# Patient Record
Sex: FEMALE | Race: WHITE | NOT HISPANIC OR LATINO | URBAN - METROPOLITAN AREA
[De-identification: names, ages, dates, MRNs, and addresses within clinical notes are randomized per-mention and may not be internally consistent; named-entity substitution may affect disease eponyms.]

---

## 2018-07-10 PROBLEM — H25.11 NS CATARACT: Noted: 2022-01-25

## 2018-07-10 PROBLEM — H35.81 MACULAR EDEMA: Noted: 2019-02-27

## 2018-07-10 PROBLEM — H34.8110 CENTRAL RETINAL VEIN OCCLUSION: Noted: 2022-01-25

## 2018-07-10 PROBLEM — H43.813 POSTERIOR VITREOUS DETACHMENT: Noted: 2022-01-25

## 2018-07-10 PROBLEM — D31.31 CHOROIDAL NEVUS: Noted: 2022-01-25

## 2018-07-10 PROBLEM — H35.032 HYPERTENSIVE RETINOPATHY: Noted: 2022-01-25

## 2019-02-27 PROBLEM — D31.31 CHOROIDAL NEVUS: Noted: 2022-01-25

## 2019-02-27 PROBLEM — H35.81 MACULAR EDEMA: Noted: 2019-02-27

## 2019-02-27 PROBLEM — H43.813 POSTERIOR VITREOUS DETACHMENT: Noted: 2022-01-25

## 2019-02-27 PROBLEM — H35.032 HYPERTENSIVE RETINOPATHY: Noted: 2022-01-25

## 2019-02-27 PROBLEM — H25.13 NS CATARACT: Noted: 2022-01-25

## 2019-02-27 PROBLEM — H34.8110 CENTRAL RETINAL VEIN OCCLUSION: Noted: 2022-01-25

## 2022-01-25 ENCOUNTER — PREPPED CHART (OUTPATIENT)
Dept: URBAN - METROPOLITAN AREA CLINIC 49 | Facility: CLINIC | Age: 77
End: 2022-01-25

## 2022-01-25 PROBLEM — H34.8110 CENTRAL RETINAL VEIN OCCLUSION: Status: STABILIZING | Noted: 2022-01-25

## 2022-01-25 PROBLEM — H43.813 POSTERIOR VITREOUS DETACHMENT: Status: STABILIZING | Noted: 2022-01-25

## 2022-01-25 PROBLEM — H35.032 HYPERTENSIVE RETINOPATHY: Status: STABILIZING | Noted: 2022-01-25

## 2022-01-25 PROBLEM — D31.31 CHOROIDAL NEVUS: Status: STABILIZING | Noted: 2022-01-25

## 2022-01-25 PROBLEM — H34.8110 CENTRAL RETINAL VEIN OCCLUSION: Noted: 2022-01-25

## 2022-01-25 PROBLEM — H25.13 NS CATARACT: Status: STABILIZING | Noted: 2022-01-25

## 2022-01-25 PROBLEM — H35.032 HYPERTENSIVE RETINOPATHY: Noted: 2022-01-25

## 2022-01-25 PROBLEM — H43.813 POSTERIOR VITREOUS DETACHMENT: Noted: 2022-01-25

## 2022-01-25 PROBLEM — H25.13 NS CATARACT: Noted: 2022-01-25

## 2022-01-25 PROBLEM — D31.31 CHOROIDAL NEVUS: Noted: 2022-01-25

## 2022-03-17 ASSESSMENT — TONOMETRY
OS_IOP_MMHG: 14
OD_IOP_MMHG: 12

## 2022-03-17 ASSESSMENT — VISUAL ACUITY
OD_CC: CF 3FT
OS_CC: 20/30

## 2022-03-22 ENCOUNTER — FOLLOW UP (OUTPATIENT)
Dept: URBAN - METROPOLITAN AREA CLINIC 49 | Facility: CLINIC | Age: 77
End: 2022-03-22

## 2022-03-22 DIAGNOSIS — H43.813: ICD-10-CM

## 2022-03-22 DIAGNOSIS — H35.032: ICD-10-CM

## 2022-03-22 DIAGNOSIS — H34.8110: ICD-10-CM

## 2022-03-22 DIAGNOSIS — D31.31: ICD-10-CM

## 2022-03-22 PROCEDURE — 92134 CPTRZ OPH DX IMG PST SGM RTA: CPT

## 2022-03-22 PROCEDURE — PFS EYLEA PFS

## 2022-03-22 PROCEDURE — 67028 INJECTION EYE DRUG: CPT

## 2022-03-22 PROCEDURE — 92014 COMPRE OPH EXAM EST PT 1/>: CPT

## 2022-03-22 ASSESSMENT — VISUAL ACUITY
OD_CC: CF 3FT
OS_CC: 20/30

## 2022-03-22 ASSESSMENT — TONOMETRY
OD_IOP_MMHG: 14
OS_IOP_MMHG: 14

## 2022-05-17 ENCOUNTER — FOLLOW UP (OUTPATIENT)
Dept: URBAN - METROPOLITAN AREA CLINIC 49 | Facility: CLINIC | Age: 77
End: 2022-05-17

## 2022-05-17 DIAGNOSIS — H34.8110: ICD-10-CM

## 2022-05-17 DIAGNOSIS — D31.31: ICD-10-CM

## 2022-05-17 DIAGNOSIS — H43.813: ICD-10-CM

## 2022-05-17 DIAGNOSIS — H25.13: ICD-10-CM

## 2022-05-17 DIAGNOSIS — H35.032: ICD-10-CM

## 2022-05-17 PROCEDURE — PFS EYLEA PFS

## 2022-05-17 PROCEDURE — 99214 OFFICE O/P EST MOD 30 MIN: CPT | Mod: 25

## 2022-05-17 PROCEDURE — 67028 INJECTION EYE DRUG: CPT

## 2022-05-17 PROCEDURE — 92134 CPTRZ OPH DX IMG PST SGM RTA: CPT

## 2022-05-17 ASSESSMENT — VISUAL ACUITY
OD_SC: CF 2FT
OS_CC: 20/40

## 2022-05-17 ASSESSMENT — TONOMETRY
OD_IOP_MMHG: 13
OS_IOP_MMHG: 16

## 2022-07-27 ENCOUNTER — FOLLOW UP (OUTPATIENT)
Dept: URBAN - METROPOLITAN AREA CLINIC 49 | Facility: CLINIC | Age: 77
End: 2022-07-27

## 2022-07-27 DIAGNOSIS — H25.13: ICD-10-CM

## 2022-07-27 DIAGNOSIS — H35.032: ICD-10-CM

## 2022-07-27 DIAGNOSIS — D31.31: ICD-10-CM

## 2022-07-27 DIAGNOSIS — H43.813: ICD-10-CM

## 2022-07-27 DIAGNOSIS — H34.8110: ICD-10-CM

## 2022-07-27 PROCEDURE — 92014 COMPRE OPH EXAM EST PT 1/>: CPT | Mod: 25

## 2022-07-27 PROCEDURE — PFS EYLEA PFS

## 2022-07-27 PROCEDURE — 67028 INJECTION EYE DRUG: CPT

## 2022-07-27 PROCEDURE — 92134 CPTRZ OPH DX IMG PST SGM RTA: CPT

## 2022-07-27 ASSESSMENT — TONOMETRY
OD_IOP_MMHG: 13
OS_IOP_MMHG: 15

## 2022-07-27 ASSESSMENT — VISUAL ACUITY
OD_CC: CF 3FT
OS_CC: 20/25-2

## 2022-10-05 ENCOUNTER — FOLLOW UP (OUTPATIENT)
Dept: URBAN - METROPOLITAN AREA CLINIC 49 | Facility: CLINIC | Age: 77
End: 2022-10-05

## 2022-10-05 DIAGNOSIS — D31.31: ICD-10-CM

## 2022-10-05 DIAGNOSIS — H43.813: ICD-10-CM

## 2022-10-05 DIAGNOSIS — H34.8110: ICD-10-CM

## 2022-10-05 DIAGNOSIS — H35.032: ICD-10-CM

## 2022-10-05 DIAGNOSIS — H25.13: ICD-10-CM

## 2022-10-05 PROCEDURE — 67028 INJECTION EYE DRUG: CPT

## 2022-10-05 PROCEDURE — 92014 COMPRE OPH EXAM EST PT 1/>: CPT | Mod: 25

## 2022-10-05 PROCEDURE — 92134 CPTRZ OPH DX IMG PST SGM RTA: CPT

## 2022-10-05 PROCEDURE — PFS EYLEA PFS

## 2022-10-05 ASSESSMENT — TONOMETRY
OD_IOP_MMHG: 12
OS_IOP_MMHG: 13

## 2022-10-05 ASSESSMENT — VISUAL ACUITY
OD_CC: CF 2FT
OS_CC: 20/30+2

## 2023-01-04 ENCOUNTER — FOLLOW UP (OUTPATIENT)
Dept: URBAN - METROPOLITAN AREA CLINIC 49 | Facility: CLINIC | Age: 78
End: 2023-01-04

## 2023-01-04 DIAGNOSIS — H25.13: ICD-10-CM

## 2023-01-04 DIAGNOSIS — D31.31: ICD-10-CM

## 2023-01-04 DIAGNOSIS — H34.8110: ICD-10-CM

## 2023-01-04 DIAGNOSIS — H35.032: ICD-10-CM

## 2023-01-04 DIAGNOSIS — H43.813: ICD-10-CM

## 2023-01-04 PROCEDURE — 92134 CPTRZ OPH DX IMG PST SGM RTA: CPT

## 2023-01-04 PROCEDURE — PFS EYLEA PFS

## 2023-01-04 PROCEDURE — 67028 INJECTION EYE DRUG: CPT

## 2023-01-04 PROCEDURE — 92014 COMPRE OPH EXAM EST PT 1/>: CPT | Mod: 25

## 2023-01-04 ASSESSMENT — TONOMETRY
OD_IOP_MMHG: 21
OS_IOP_MMHG: 17

## 2023-01-04 ASSESSMENT — VISUAL ACUITY
OS_CC: 20/40
OD_CC: CF 2FT

## 2023-03-22 ENCOUNTER — FOLLOW UP (OUTPATIENT)
Dept: URBAN - METROPOLITAN AREA CLINIC 49 | Facility: CLINIC | Age: 78
End: 2023-03-22

## 2023-03-22 DIAGNOSIS — H34.8110: ICD-10-CM

## 2023-03-22 DIAGNOSIS — H43.813: ICD-10-CM

## 2023-03-22 DIAGNOSIS — H35.032: ICD-10-CM

## 2023-03-22 DIAGNOSIS — H25.13: ICD-10-CM

## 2023-03-22 DIAGNOSIS — D31.31: ICD-10-CM

## 2023-03-22 PROCEDURE — 92134 CPTRZ OPH DX IMG PST SGM RTA: CPT

## 2023-03-22 PROCEDURE — 67028 INJECTION EYE DRUG: CPT

## 2023-03-22 PROCEDURE — 92014 COMPRE OPH EXAM EST PT 1/>: CPT | Mod: 25

## 2023-03-22 PROCEDURE — PFS EYLEA PFS

## 2023-03-22 ASSESSMENT — VISUAL ACUITY: OS_SC: 20/30

## 2023-03-22 ASSESSMENT — TONOMETRY
OS_IOP_MMHG: 19
OD_IOP_MMHG: 17

## 2023-06-07 ENCOUNTER — FOLLOW UP (OUTPATIENT)
Dept: URBAN - METROPOLITAN AREA CLINIC 49 | Facility: CLINIC | Age: 78
End: 2023-06-07

## 2023-06-07 DIAGNOSIS — H35.032: ICD-10-CM

## 2023-06-07 DIAGNOSIS — G43.B0: ICD-10-CM

## 2023-06-07 DIAGNOSIS — D31.31: ICD-10-CM

## 2023-06-07 DIAGNOSIS — H43.813: ICD-10-CM

## 2023-06-07 DIAGNOSIS — H31.092: ICD-10-CM

## 2023-06-07 DIAGNOSIS — H34.8110: ICD-10-CM

## 2023-06-07 DIAGNOSIS — H25.13: ICD-10-CM

## 2023-06-07 PROCEDURE — 67028 INJECTION EYE DRUG: CPT

## 2023-06-07 PROCEDURE — PFS EYLEA PFS

## 2023-06-07 PROCEDURE — 92014 COMPRE OPH EXAM EST PT 1/>: CPT | Mod: 25

## 2023-06-07 PROCEDURE — 92235 FLUORESCEIN ANGRPH MLTIFRAME: CPT

## 2023-06-07 PROCEDURE — 92202 OPSCPY EXTND ON/MAC DRAW: CPT | Mod: 59

## 2023-06-07 PROCEDURE — 92134 CPTRZ OPH DX IMG PST SGM RTA: CPT

## 2023-06-07 ASSESSMENT — TONOMETRY
OS_IOP_MMHG: 15
OD_IOP_MMHG: 14

## 2023-06-07 ASSESSMENT — VISUAL ACUITY
OD_SC: CF 2FT
OS_SC: 20/30-1

## 2023-07-18 ENCOUNTER — HOSPITAL ENCOUNTER (EMERGENCY)
Facility: HOSPITAL | Age: 78
Discharge: HOME | End: 2023-07-18
Attending: EMERGENCY MEDICINE
Payer: MEDICARE

## 2023-07-18 VITALS
RESPIRATION RATE: 16 BRPM | OXYGEN SATURATION: 95 % | HEIGHT: 62 IN | SYSTOLIC BLOOD PRESSURE: 149 MMHG | HEART RATE: 83 BPM | DIASTOLIC BLOOD PRESSURE: 83 MMHG | BODY MASS INDEX: 24.27 KG/M2 | TEMPERATURE: 98.4 F | WEIGHT: 131.9 LBS

## 2023-07-18 DIAGNOSIS — S01.81XA FOREHEAD LACERATION, INITIAL ENCOUNTER: Primary | ICD-10-CM

## 2023-07-18 DIAGNOSIS — S09.90XA INJURY OF HEAD, INITIAL ENCOUNTER: ICD-10-CM

## 2023-07-18 PROCEDURE — 12013 RPR F/E/E/N/L/M 2.6-5.0 CM: CPT

## 2023-07-18 PROCEDURE — MED12632

## 2023-07-18 PROCEDURE — 99282 EMERGENCY DEPT VISIT SF MDM: CPT

## 2023-07-18 ASSESSMENT — PAIN SCALES - GENERAL
PAINLEVEL_OUTOF10: 3
PAINLEVEL_OUTOF10: 3
PAINLEVEL_OUTOF10: 5

## 2023-07-18 ASSESSMENT — LIFESTYLE VARIABLES
HOW MANY STANDARD DRINKS CONTAINING ALCOHOL DO YOU HAVE ON A TYPICAL DAY: PATIENT DOES NOT DRINK
HOW OFTEN DO YOU HAVE A DRINK CONTAINING ALCOHOL: NEVER

## 2023-07-18 ASSESSMENT — PAIN - FUNCTIONAL ASSESSMENT
PAIN_FUNCTIONAL_ASSESSMENT: ACTIVITIES ARE NOT PREVENTED
PAIN_FUNCTIONAL_ASSESSMENT: ACTIVITIES ARE NOT PREVENTED
PAIN_FUNCTIONAL_ASSESSMENT: 0-10
PAIN_FUNCTIONAL_ASSESSMENT: 0-10

## 2023-07-18 ASSESSMENT — PAIN DESCRIPTION - ORIENTATION
ORIENTATION: LEFT
ORIENTATION: LEFT

## 2023-07-18 ASSESSMENT — PAIN DESCRIPTION - FREQUENCY
FREQUENCY: CONTINUOUS
FREQUENCY: INTERMITTENT

## 2023-07-18 ASSESSMENT — PAIN DESCRIPTION - LOCATION
LOCATION: HEAD
LOCATION: HEAD

## 2023-07-18 ASSESSMENT — PAIN DESCRIPTION - ONSET
ONSET: ON-GOING
ONSET: ON-GOING

## 2023-07-18 ASSESSMENT — PAIN DESCRIPTION - DESCRIPTORS
DESCRIPTORS: ACHING
DESCRIPTORS: ACHING

## 2023-07-18 ASSESSMENT — PAIN DESCRIPTION - PAIN TYPE: TYPE: ACUTE PAIN

## 2023-07-19 NOTE — ED PROVIDER NOTES
[facility] Name: Ángel Morejon  MRN: [id number]  [facility] 1945  Date of evaluation: 7/18/2023  Provider: Ciera Mathews MD   PCP: No primary care provider on file. Note Started: 9:17 PM 7/18/23     CHIEF COMPLAINT       Chief Complaint   Patient presents with    Head Laceration        HISTORY OF PRESENT ILLNESS: 1 or more elements      History From: Patient, , EMT friend, History limited by: none     Ángel Morejon is a 69 y.o. female who presents to the emergency department by car accompanied by friend and  with a forehead laceration. Patient had finished dinner, tripped over a rowing machine landing on her forehead. Patient had no loss of consciousness, but had a laceration that was bleeding. Bleeding resolved with direct pressure, came to the emergency department for concern that she needed stitches. Patient ambulates without difficulty, no significant headache, no neck pain, neurologically intact. Nursing Notes were all reviewed and agreed with or any disagreements were addressed in the HPI. REVIEW OF SYSTEMS        Positives and Pertinent negatives as per HPI. PAST HISTORY     Past Medical History:  No past medical history on file. Past Surgical History:  No past surgical history on file. Family History:  No family history on file. Social History: Allergies:  No Known Allergies    CURRENT MEDICATIONS      Previous Medications    No medications on file       SCREENINGS               No data recorded         PHYSICAL EXAM      ED Triage Vitals   Enc Vitals Group      BP       Pulse       Resp       Temp       Temp src       SpO2       Weight       Height       Head Circumference       Peak Flow       Pain Score       Pain Loc       Pain Edu? Excl. in [address]? Physical Exam  Constitutional:       Appearance: Normal appearance.       Comments: Patient alert, appears normal other than mild

## 2023-07-19 NOTE — ED TRIAGE NOTES
Pt reports laceration to left forehead - no active bleeding. Reports that she tripped over leg of exercise machine. Denies LOC. C/O headache. Td greater than 5 years ago.

## 2023-08-23 ENCOUNTER — FOLLOW UP (OUTPATIENT)
Dept: URBAN - METROPOLITAN AREA CLINIC 49 | Facility: CLINIC | Age: 78
End: 2023-08-23

## 2023-08-23 DIAGNOSIS — H34.8110: ICD-10-CM

## 2023-08-23 DIAGNOSIS — H35.032: ICD-10-CM

## 2023-08-23 DIAGNOSIS — G43.B0: ICD-10-CM

## 2023-08-23 DIAGNOSIS — H25.13: ICD-10-CM

## 2023-08-23 DIAGNOSIS — D31.31: ICD-10-CM

## 2023-08-23 DIAGNOSIS — H43.813: ICD-10-CM

## 2023-08-23 DIAGNOSIS — H31.092: ICD-10-CM

## 2023-08-23 PROCEDURE — 67028 INJECTION EYE DRUG: CPT

## 2023-08-23 PROCEDURE — PFS EYLEA PFS: Mod: JZ

## 2023-08-23 PROCEDURE — 92014 COMPRE OPH EXAM EST PT 1/>: CPT | Mod: 25

## 2023-08-23 PROCEDURE — 92134 CPTRZ OPH DX IMG PST SGM RTA: CPT

## 2023-08-23 ASSESSMENT — VISUAL ACUITY
OD_CC: CF 2FT
OS_CC: 20/60+1

## 2023-08-23 ASSESSMENT — TONOMETRY
OS_IOP_MMHG: 19
OD_IOP_MMHG: 16

## 2023-11-15 ENCOUNTER — FOLLOW UP (OUTPATIENT)
Dept: URBAN - METROPOLITAN AREA CLINIC 49 | Facility: CLINIC | Age: 78
End: 2023-11-15

## 2023-11-15 DIAGNOSIS — D31.31: ICD-10-CM

## 2023-11-15 DIAGNOSIS — H35.032: ICD-10-CM

## 2023-11-15 DIAGNOSIS — H34.8110: ICD-10-CM

## 2023-11-15 DIAGNOSIS — H43.813: ICD-10-CM

## 2023-11-15 PROCEDURE — 92014 COMPRE OPH EXAM EST PT 1/>: CPT | Mod: 25

## 2023-11-15 PROCEDURE — 92202 OPSCPY EXTND ON/MAC DRAW: CPT | Mod: NC

## 2023-11-15 PROCEDURE — PFS EYLEA PFS: Mod: JZ

## 2023-11-15 PROCEDURE — 92134 CPTRZ OPH DX IMG PST SGM RTA: CPT

## 2023-11-15 PROCEDURE — 67028 INJECTION EYE DRUG: CPT

## 2023-11-15 ASSESSMENT — TONOMETRY
OD_IOP_MMHG: 16
OS_IOP_MMHG: 16

## 2023-11-15 ASSESSMENT — VISUAL ACUITY
OD_CC: CF 2FT
OS_CC: 20/25-2

## 2024-01-24 ENCOUNTER — FOLLOW UP (OUTPATIENT)
Dept: URBAN - METROPOLITAN AREA CLINIC 49 | Facility: CLINIC | Age: 79
End: 2024-01-24

## 2024-01-24 DIAGNOSIS — H34.8110: ICD-10-CM

## 2024-01-24 DIAGNOSIS — D31.31: ICD-10-CM

## 2024-01-24 DIAGNOSIS — H43.813: ICD-10-CM

## 2024-01-24 DIAGNOSIS — H35.032: ICD-10-CM

## 2024-01-24 PROCEDURE — PFS EYLEA PFS: Mod: JZ

## 2024-01-24 PROCEDURE — 67028 INJECTION EYE DRUG: CPT

## 2024-01-24 PROCEDURE — 92134 CPTRZ OPH DX IMG PST SGM RTA: CPT

## 2024-01-24 PROCEDURE — 92014 COMPRE OPH EXAM EST PT 1/>: CPT | Mod: 25

## 2024-01-24 ASSESSMENT — TONOMETRY
OS_IOP_MMHG: 15
OD_IOP_MMHG: 15

## 2024-01-24 ASSESSMENT — VISUAL ACUITY
OS_SC: 20/25
OD_SC: CF 2FT

## 2024-04-03 ENCOUNTER — FOLLOW UP (OUTPATIENT)
Dept: URBAN - METROPOLITAN AREA CLINIC 49 | Facility: CLINIC | Age: 79
End: 2024-04-03

## 2024-04-03 DIAGNOSIS — H35.032: ICD-10-CM

## 2024-04-03 DIAGNOSIS — H31.092: ICD-10-CM

## 2024-04-03 DIAGNOSIS — H43.813: ICD-10-CM

## 2024-04-03 DIAGNOSIS — D31.31: ICD-10-CM

## 2024-04-03 DIAGNOSIS — H34.8110: ICD-10-CM

## 2024-04-03 PROCEDURE — 92201 OPSCPY EXTND RTA DRAW UNI/BI: CPT | Mod: 59

## 2024-04-03 PROCEDURE — 92134 CPTRZ OPH DX IMG PST SGM RTA: CPT

## 2024-04-03 PROCEDURE — 67028 INJECTION EYE DRUG: CPT

## 2024-04-03 PROCEDURE — 92014 COMPRE OPH EXAM EST PT 1/>: CPT | Mod: 25

## 2024-04-03 PROCEDURE — PFS EYLEA PFS: Mod: JZ

## 2024-04-03 ASSESSMENT — VISUAL ACUITY: OS_SC: 20/20-3

## 2024-04-03 ASSESSMENT — TONOMETRY
OD_IOP_MMHG: 13
OS_IOP_MMHG: 13

## 2024-06-12 ENCOUNTER — FOLLOW UP (OUTPATIENT)
Dept: URBAN - METROPOLITAN AREA CLINIC 49 | Facility: CLINIC | Age: 79
End: 2024-06-12

## 2024-06-12 DIAGNOSIS — D31.31: ICD-10-CM

## 2024-06-12 DIAGNOSIS — H43.813: ICD-10-CM

## 2024-06-12 DIAGNOSIS — H34.8110: ICD-10-CM

## 2024-06-12 DIAGNOSIS — H35.032: ICD-10-CM

## 2024-06-12 DIAGNOSIS — H31.092: ICD-10-CM

## 2024-06-12 PROCEDURE — PFS EYLEA PFS: Mod: JZ

## 2024-06-12 PROCEDURE — 92201 OPSCPY EXTND RTA DRAW UNI/BI: CPT | Mod: 59

## 2024-06-12 PROCEDURE — 92134 CPTRZ OPH DX IMG PST SGM RTA: CPT

## 2024-06-12 PROCEDURE — 67028 INJECTION EYE DRUG: CPT

## 2024-06-12 PROCEDURE — 92014 COMPRE OPH EXAM EST PT 1/>: CPT | Mod: 25

## 2024-06-12 ASSESSMENT — TONOMETRY
OS_IOP_MMHG: 12
OD_IOP_MMHG: 13

## 2024-06-12 ASSESSMENT — VISUAL ACUITY
OS_SC: 20/20
OD_SC: CF 2FT

## 2024-08-21 ENCOUNTER — FOLLOW UP (OUTPATIENT)
Dept: URBAN - METROPOLITAN AREA CLINIC 49 | Facility: CLINIC | Age: 79
End: 2024-08-21

## 2024-08-21 DIAGNOSIS — H34.8110: ICD-10-CM

## 2024-08-21 DIAGNOSIS — H43.813: ICD-10-CM

## 2024-08-21 DIAGNOSIS — H35.032: ICD-10-CM

## 2024-08-21 DIAGNOSIS — D31.31: ICD-10-CM

## 2024-08-21 DIAGNOSIS — H31.092: ICD-10-CM

## 2024-08-21 PROCEDURE — 67028 INJECTION EYE DRUG: CPT

## 2024-08-21 PROCEDURE — 92134 CPTRZ OPH DX IMG PST SGM RTA: CPT

## 2024-08-21 PROCEDURE — 92201 OPSCPY EXTND RTA DRAW UNI/BI: CPT | Mod: 59

## 2024-08-21 PROCEDURE — 92014 COMPRE OPH EXAM EST PT 1/>: CPT | Mod: 25

## 2024-08-21 PROCEDURE — PFS EYLEA PFS: Mod: JZ

## 2024-08-21 ASSESSMENT — VISUAL ACUITY
OD_SC: CF 3FT
OS_SC: 20/20

## 2024-08-21 ASSESSMENT — TONOMETRY
OD_IOP_MMHG: 13
OS_IOP_MMHG: 12

## 2024-10-30 ENCOUNTER — FOLLOW UP (OUTPATIENT)
Dept: URBAN - METROPOLITAN AREA CLINIC 49 | Facility: CLINIC | Age: 79
End: 2024-10-30

## 2024-10-30 DIAGNOSIS — D31.31: ICD-10-CM

## 2024-10-30 DIAGNOSIS — H34.8110: ICD-10-CM

## 2024-10-30 DIAGNOSIS — H31.092: ICD-10-CM

## 2024-10-30 DIAGNOSIS — H35.032: ICD-10-CM

## 2024-10-30 DIAGNOSIS — H43.813: ICD-10-CM

## 2024-10-30 PROCEDURE — 92014 COMPRE OPH EXAM EST PT 1/>: CPT | Mod: 25

## 2024-10-30 PROCEDURE — PFS EYLEA PFS: Mod: JZ

## 2024-10-30 PROCEDURE — 67028 INJECTION EYE DRUG: CPT

## 2024-10-30 PROCEDURE — 92134 CPTRZ OPH DX IMG PST SGM RTA: CPT

## 2024-10-30 ASSESSMENT — VISUAL ACUITY
OD_SC: CF 3FT
OS_SC: 20/20

## 2024-10-30 ASSESSMENT — TONOMETRY
OS_IOP_MMHG: 14
OD_IOP_MMHG: 13

## 2025-01-08 ENCOUNTER — FOLLOW UP (OUTPATIENT)
Dept: URBAN - METROPOLITAN AREA CLINIC 49 | Facility: CLINIC | Age: 80
End: 2025-01-08

## 2025-01-08 DIAGNOSIS — D31.31: ICD-10-CM

## 2025-01-08 DIAGNOSIS — H34.8110: ICD-10-CM

## 2025-01-08 DIAGNOSIS — H43.813: ICD-10-CM

## 2025-01-08 DIAGNOSIS — H35.032: ICD-10-CM

## 2025-01-08 PROCEDURE — PFS EYLEA PFS: Mod: JZ

## 2025-01-08 PROCEDURE — 67028 INJECTION EYE DRUG: CPT

## 2025-01-08 PROCEDURE — 92014 COMPRE OPH EXAM EST PT 1/>: CPT | Mod: 25

## 2025-01-08 PROCEDURE — 92134 CPTRZ OPH DX IMG PST SGM RTA: CPT

## 2025-01-08 ASSESSMENT — VISUAL ACUITY
OS_SC: 20/20
OD_SC: CF 2FT

## 2025-01-08 ASSESSMENT — TONOMETRY
OD_IOP_MMHG: 16
OS_IOP_MMHG: 18

## 2025-08-06 ENCOUNTER — FOLLOW UP (OUTPATIENT)
Dept: URBAN - METROPOLITAN AREA CLINIC 49 | Facility: CLINIC | Age: 80
End: 2025-08-06

## 2025-08-06 DIAGNOSIS — H35.09: ICD-10-CM

## 2025-08-06 DIAGNOSIS — H34.8110: ICD-10-CM

## 2025-08-06 DIAGNOSIS — D31.31: ICD-10-CM

## 2025-08-06 DIAGNOSIS — H43.813: ICD-10-CM

## 2025-08-06 DIAGNOSIS — H35.032: ICD-10-CM

## 2025-08-06 DIAGNOSIS — H31.092: ICD-10-CM

## 2025-08-06 PROCEDURE — PFS EYLEA PFS: Mod: JZ

## 2025-08-06 PROCEDURE — 67028 INJECTION EYE DRUG: CPT

## 2025-08-06 PROCEDURE — 92134 CPTRZ OPH DX IMG PST SGM RTA: CPT

## 2025-08-06 PROCEDURE — 92202 OPSCPY EXTND ON/MAC DRAW: CPT | Mod: NC

## 2025-08-06 PROCEDURE — 92014 COMPRE OPH EXAM EST PT 1/>: CPT | Mod: 25

## 2025-08-06 ASSESSMENT — TONOMETRY: OD_IOP_MMHG: 17

## 2025-08-06 ASSESSMENT — VISUAL ACUITY: OD_SC: CF 1FT
